# Patient Record
Sex: MALE | URBAN - METROPOLITAN AREA
[De-identification: names, ages, dates, MRNs, and addresses within clinical notes are randomized per-mention and may not be internally consistent; named-entity substitution may affect disease eponyms.]

---

## 2022-04-06 ENCOUNTER — OFFICE VISIT (OUTPATIENT)
Dept: URBAN - METROPOLITAN AREA CLINIC 88 | Facility: CLINIC | Age: 82
End: 2022-04-06
Payer: MEDICARE

## 2022-04-06 DIAGNOSIS — H53.2 DIPLOPIA: Primary | ICD-10-CM

## 2022-04-06 DIAGNOSIS — Z96.1 PRESENCE OF INTRAOCULAR LENS: ICD-10-CM

## 2022-04-06 DIAGNOSIS — Z98.890 OTHER SPECIFIED POSTPROCEDURAL STATES: ICD-10-CM

## 2022-04-06 PROCEDURE — 99203 OFFICE O/P NEW LOW 30 MIN: CPT | Performed by: OPHTHALMOLOGY

## 2022-04-06 ASSESSMENT — VISUAL ACUITY
OD: 20/60
OS: 20/40

## 2022-04-06 ASSESSMENT — INTRAOCULAR PRESSURE
OS: 10
OD: 11

## 2022-04-06 NOTE — IMPRESSION/PLAN
Impression: Other specified postprocedural states: Z98.890. Plan: S/P RK surgery OU and Phaco w/IOL with persistent symptomatic Diplopia. I have explained to the patient he needs to a Corneal Specialist in order to determine his corrective glasses or if he has any other options to improve the vision of both eyes.